# Patient Record
Sex: FEMALE | Race: WHITE | NOT HISPANIC OR LATINO | URBAN - METROPOLITAN AREA
[De-identification: names, ages, dates, MRNs, and addresses within clinical notes are randomized per-mention and may not be internally consistent; named-entity substitution may affect disease eponyms.]

---

## 2019-07-11 ENCOUNTER — EMERGENCY (EMERGENCY)
Facility: HOSPITAL | Age: 59
LOS: 1 days | Discharge: HOME | End: 2019-07-11
Attending: EMERGENCY MEDICINE | Admitting: EMERGENCY MEDICINE
Payer: MEDICAID

## 2019-07-11 VITALS
RESPIRATION RATE: 16 BRPM | DIASTOLIC BLOOD PRESSURE: 84 MMHG | OXYGEN SATURATION: 99 % | SYSTOLIC BLOOD PRESSURE: 126 MMHG | TEMPERATURE: 98 F | HEART RATE: 81 BPM | HEIGHT: 68 IN | WEIGHT: 154.98 LBS

## 2019-07-11 VITALS
DIASTOLIC BLOOD PRESSURE: 69 MMHG | TEMPERATURE: 98 F | RESPIRATION RATE: 16 BRPM | HEART RATE: 69 BPM | OXYGEN SATURATION: 100 % | SYSTOLIC BLOOD PRESSURE: 110 MMHG

## 2019-07-11 DIAGNOSIS — R42 DIZZINESS AND GIDDINESS: ICD-10-CM

## 2019-07-11 DIAGNOSIS — R55 SYNCOPE AND COLLAPSE: ICD-10-CM

## 2019-07-11 PROCEDURE — 85610 PROTHROMBIN TIME: CPT

## 2019-07-11 PROCEDURE — 99283 EMERGENCY DEPT VISIT LOW MDM: CPT

## 2019-07-11 PROCEDURE — 85025 COMPLETE CBC W/AUTO DIFF WBC: CPT

## 2019-07-11 PROCEDURE — 80053 COMPREHEN METABOLIC PANEL: CPT

## 2019-07-11 PROCEDURE — 85730 THROMBOPLASTIN TIME PARTIAL: CPT

## 2019-07-11 PROCEDURE — 93005 ELECTROCARDIOGRAM TRACING: CPT

## 2019-07-11 PROCEDURE — 36415 COLL VENOUS BLD VENIPUNCTURE: CPT

## 2019-07-11 PROCEDURE — 84484 ASSAY OF TROPONIN QUANT: CPT

## 2019-07-11 PROCEDURE — 82553 CREATINE MB FRACTION: CPT

## 2019-07-11 PROCEDURE — 93010 ELECTROCARDIOGRAM REPORT: CPT

## 2019-07-11 PROCEDURE — 99284 EMERGENCY DEPT VISIT MOD MDM: CPT

## 2019-07-11 PROCEDURE — 82550 ASSAY OF CK (CPK): CPT

## 2019-07-11 NOTE — ED ADULT TRIAGE NOTE - CHIEF COMPLAINT QUOTE
[place] S[place].  FSBG in field: 135.  EKG in progress.  Pt denies head injury, dizziness, N/V/D, SOB, Fevers and CP.  Pt arrived to ED A&Ox4. PT [place] s/[place] Syncope.  FSBG in field: 135.  EKG in progress.  Pt denies head injury, dizziness, N/V/D, SOB, Fevers and CP.  Pt arrived to ED A&Ox4.  EMS placed #20 PIV to Right AC and admin IVF.

## 2019-07-11 NOTE — ED PROVIDER NOTE - CLINICAL SUMMARY MEDICAL DECISION MAKING FREE TEXT BOX
[place].  suspect related to overheating/ not eating today.  now asymptomatic, normal exam. troponin neg and ecg non ischemic making acs unlikely, no arrhythmia.  basic labs with normal electrolytes

## 2019-07-11 NOTE — ED ADULT NURSE NOTE - CHIEF COMPLAINT QUOTE
[place] s/[place] Syncope.  FSBG in field: 135.  EKG in progress.  Pt denies head injury, dizziness, N/V/D, SOB, Fevers and CP.  Pt arrived to ED A&Ox4.  EMS placed #20 PIV to Right AC and admin IVF.

## 2019-07-11 NOTE — ED PROVIDER NOTE - NSFOLLOWUPINSTRUCTIONS_ED_ALL_ED_FT
Please see your primary care provider in 24-48 hours as needed.  Call for appointment.  Return to the ER if symptoms worsen or other concerns.    Syncope    Syncope is when you temporarily lose consciousness, also called fainting or passing out. It is caused by a sudden decrease in blood flow to the brain. Even though most causes of syncope are not dangerous, syncope can possibly be a sign of a serious medical problem. Signs that you may be about to faint include feeling dizzy, lightheaded, nausea, visual changes, or cold/clammy skin. Do not drive, operate heavy machinery, or play sports until your health care provider says it is okay.    SEEK IMMEDIATE MEDICAL CARE IF YOU HAVE ANY OF THE FOLLOWING SYMPTOMS: severe headache, pain in your chest/abdomen/back, bleeding from your mouth or rectum, palpitations, shortness of breath, pain with breathing, seizure, confusion, or trouble walking.

## 2019-07-11 NOTE — ED ADULT NURSE NOTE - INTERVENTIONS DEFINITIONS
Instruct patient to call for assistance/[place] to call system/Provide visual cue, wrist band, yellow gown, etc./Room bathroom lighting operational/Stretcher in lowest position, wheels locked, appropriate side rails in place/Physically safe environment: no spills, clutter or unnecessary equipment

## 2019-07-11 NOTE — ED PROVIDER NOTE - OBJECTIVE STATEMENT
here with near syncope/ possible syncope.  [place] when she started to feel very lightheaded then thinks she may have passed out.  Says she remembers everything but it was hazy.  Notes it was very hot and she felt overheated in the station.  Denies chest pain, sob, palpitations, headache.  Also notes she did not eat breakfast or lunch today.  After episode had some tangerine and grapes.  Now feels better.

## 2019-07-11 NOTE — ED ADULT NURSE NOTE - OBJECTIVE STATEMENT
pt states she was lighted headed and was assisted to the floor with a friend. unsure if she lost consciousness. states [place] the subway [place] and it was "too hot and humid". denies any chest pain, sob, fever/chills. denies any dizziness at this time.